# Patient Record
Sex: MALE | Race: BLACK OR AFRICAN AMERICAN | NOT HISPANIC OR LATINO | URBAN - METROPOLITAN AREA
[De-identification: names, ages, dates, MRNs, and addresses within clinical notes are randomized per-mention and may not be internally consistent; named-entity substitution may affect disease eponyms.]

---

## 2017-06-11 ENCOUNTER — EMERGENCY (EMERGENCY)
Facility: HOSPITAL | Age: 52
LOS: 1 days | Discharge: HOME | End: 2017-06-11
Attending: EMERGENCY MEDICINE | Admitting: EMERGENCY MEDICINE
Payer: MEDICAID

## 2017-06-11 VITALS
RESPIRATION RATE: 16 BRPM | HEART RATE: 59 BPM | HEIGHT: 70 IN | OXYGEN SATURATION: 100 % | WEIGHT: 201.94 LBS | DIASTOLIC BLOOD PRESSURE: 91 MMHG | TEMPERATURE: 98 F | SYSTOLIC BLOOD PRESSURE: 148 MMHG

## 2017-06-11 PROCEDURE — 93005 ELECTROCARDIOGRAM TRACING: CPT

## 2017-06-11 PROCEDURE — 93010 ELECTROCARDIOGRAM REPORT: CPT

## 2017-06-11 PROCEDURE — 99283 EMERGENCY DEPT VISIT LOW MDM: CPT | Mod: 25

## 2017-06-11 PROCEDURE — 99284 EMERGENCY DEPT VISIT MOD MDM: CPT | Mod: 25

## 2017-06-11 RX ORDER — METFORMIN HYDROCHLORIDE 850 MG/1
1 TABLET ORAL
Qty: 60 | Refills: 0 | OUTPATIENT
Start: 2017-06-11 | End: 2017-07-11

## 2017-06-11 RX ORDER — LISINOPRIL 2.5 MG/1
1 TABLET ORAL
Qty: 30 | Refills: 0 | OUTPATIENT
Start: 2017-06-11 | End: 2017-07-11

## 2017-06-11 RX ORDER — LISINOPRIL 2.5 MG/1
1 TABLET ORAL
Qty: 0 | Refills: 0 | COMMUNITY

## 2017-06-11 RX ORDER — METFORMIN HYDROCHLORIDE 850 MG/1
1 TABLET ORAL
Qty: 0 | Refills: 0 | COMMUNITY

## 2017-06-11 NOTE — ED PROVIDER NOTE - MEDICAL DECISION MAKING DETAILS
Pt presents requesting med refill. Agitated on exam. Pt reports I just want my prescriptions and to leave. FS 94 in triage. will d/c with f/u PCP @ [facility]

## 2017-06-11 NOTE — ED ADULT NURSE REASSESSMENT NOTE - NS ED NURSE REASSESS COMMENT FT1
Pt became verbally disruptive when asked to remove sunglasses. Pt was allowed to keep sunglasses and became more cooperative. Will continue to monitor.

## 2017-06-11 NOTE — ED PROVIDER NOTE - OBJECTIVE STATEMENT
Pt with PMHx HTN, DM p/w "not feeling well." Pt reports he feels nausea for 2 days. Pt denies vomiting, diarrhea, abdominal pain, CP, SOB, f/c. Pt reports frequent urination w/o dysuria, hesitancy. No increased thirst. Pt agitated w/ questioning on exam. pt reports "I just need my prescriptions and I'll get out of here." Pt reports he takes Lisinopril 5 mg PO QD and Metformin 500 mg BID, and states he ran out of both 2 weeks ago.

## 2017-06-11 NOTE — ED ADULT NURSE NOTE - OBJECTIVE STATEMENT
52 year old male c/o nausea x2 weeks and reported running out of medication x 2 weeks due to insurance. Denies chest pain,. sob, dizziness, n/v/d, fever or chills.

## 2017-06-15 DIAGNOSIS — R11.0 NAUSEA: ICD-10-CM

## 2017-06-15 DIAGNOSIS — I10 ESSENTIAL (PRIMARY) HYPERTENSION: ICD-10-CM

## 2017-06-15 DIAGNOSIS — Z91.018 ALLERGY TO OTHER FOODS: ICD-10-CM

## 2017-06-15 DIAGNOSIS — Z76.0 ENCOUNTER FOR ISSUE OF REPEAT PRESCRIPTION: ICD-10-CM

## 2017-06-15 DIAGNOSIS — E11.9 TYPE 2 DIABETES MELLITUS WITHOUT COMPLICATIONS: ICD-10-CM

## 2022-09-30 ENCOUNTER — EMERGENCY (EMERGENCY)
Facility: HOSPITAL | Age: 57
LOS: 1 days | Discharge: LEFT BEFORE DISCHARGE | End: 2022-09-30
Admitting: EMERGENCY MEDICINE

## 2022-09-30 VITALS
HEART RATE: 86 BPM | HEIGHT: 70 IN | OXYGEN SATURATION: 96 % | WEIGHT: 186.95 LBS | TEMPERATURE: 99 F | DIASTOLIC BLOOD PRESSURE: 109 MMHG | SYSTOLIC BLOOD PRESSURE: 184 MMHG | RESPIRATION RATE: 20 BRPM

## 2022-09-30 PROBLEM — I10 ESSENTIAL (PRIMARY) HYPERTENSION: Chronic | Status: ACTIVE | Noted: 2017-06-11

## 2022-09-30 PROBLEM — E11.9 TYPE 2 DIABETES MELLITUS WITHOUT COMPLICATIONS: Chronic | Status: ACTIVE | Noted: 2017-06-11

## 2022-09-30 PROCEDURE — L9991: CPT

## 2022-09-30 NOTE — ED ADULT TRIAGE NOTE - CHIEF COMPLAINT QUOTE
As per EMS, pt found unresponsive given Narcan. Pt A&Ox3 on arrival and irritable. VS stable. Pt saying he doesn't want to stay. Pt able to ambulate with steady gait. Pt getting aggressive when asked to sit down. IV removed. Ambulated out of ED without assistance.

## 2022-10-02 DIAGNOSIS — R40.4 TRANSIENT ALTERATION OF AWARENESS: ICD-10-CM

## 2022-10-02 DIAGNOSIS — Z53.21 PROCEDURE AND TREATMENT NOT CARRIED OUT DUE TO PATIENT LEAVING PRIOR TO BEING SEEN BY HEALTH CARE PROVIDER: ICD-10-CM

## 2023-12-22 ENCOUNTER — EMERGENCY (EMERGENCY)
Facility: HOSPITAL | Age: 58
LOS: 1 days | Discharge: LEFT BEFORE DISCHARGE | End: 2023-12-22
Attending: EMERGENCY MEDICINE | Admitting: EMERGENCY MEDICINE
Payer: SELF-PAY

## 2023-12-22 VITALS
SYSTOLIC BLOOD PRESSURE: 163 MMHG | HEART RATE: 64 BPM | DIASTOLIC BLOOD PRESSURE: 84 MMHG | RESPIRATION RATE: 16 BRPM | TEMPERATURE: 98 F | OXYGEN SATURATION: 94 %

## 2023-12-22 DIAGNOSIS — E11.9 TYPE 2 DIABETES MELLITUS WITHOUT COMPLICATIONS: ICD-10-CM

## 2023-12-22 DIAGNOSIS — T40.601A POISONING BY UNSPECIFIED NARCOTICS, ACCIDENTAL (UNINTENTIONAL), INITIAL ENCOUNTER: ICD-10-CM

## 2023-12-22 DIAGNOSIS — F11.129 OPIOID ABUSE WITH INTOXICATION, UNSPECIFIED: ICD-10-CM

## 2023-12-22 DIAGNOSIS — Z23 ENCOUNTER FOR IMMUNIZATION: ICD-10-CM

## 2023-12-22 DIAGNOSIS — Z53.29 PROCEDURE AND TREATMENT NOT CARRIED OUT BECAUSE OF PATIENT'S DECISION FOR OTHER REASONS: ICD-10-CM

## 2023-12-22 DIAGNOSIS — I10 ESSENTIAL (PRIMARY) HYPERTENSION: ICD-10-CM

## 2023-12-22 PROCEDURE — 99283 EMERGENCY DEPT VISIT LOW MDM: CPT

## 2023-12-22 PROCEDURE — 99053 MED SERV 10PM-8AM 24 HR FAC: CPT

## 2023-12-22 RX ORDER — NALOXONE HYDROCHLORIDE 4 MG/.1ML
2 SPRAY NASAL ONCE
Refills: 0 | Status: DISCONTINUED | OUTPATIENT
Start: 2023-12-22 | End: 2023-12-25

## 2023-12-22 RX ORDER — TETANUS TOXOID, REDUCED DIPHTHERIA TOXOID AND ACELLULAR PERTUSSIS VACCINE, ADSORBED 5; 2.5; 8; 8; 2.5 [IU]/.5ML; [IU]/.5ML; UG/.5ML; UG/.5ML; UG/.5ML
0.5 SUSPENSION INTRAMUSCULAR ONCE
Refills: 0 | Status: DISCONTINUED | OUTPATIENT
Start: 2023-12-22 | End: 2023-12-25

## 2023-12-22 NOTE — ED PROVIDER NOTE - OBJECTIVE STATEMENT
58M history of HTN, DM, substance abuse  Patient brought in by ambulance from [facility] for suspected opiate overdose after being found with minimal respirations and pinpoint pupils.  Given 4 mg of intranasal Narcan prior to arrival with good response.  The patient denies any drug use upon questioning.  Intermittently compliant with exam due to mental status and clinical condition.  Unable to provide much further information. 58M history of HTN, DM, substance abuse  Patient brought in by ambulance from [facility] for suspected opiate overdose after being found with minimal respirations and pinpoint pupils.  Given 4 mg of intranasal Narcan prior to arrival with good response.  The patient denies any drug use upon questioning.  Intermittently compliant with exam due to mental status and clinical condition.  Unable to provide much further information.

## 2023-12-22 NOTE — ED PROVIDER NOTE - REFUSAL OF SERVICE, MDM
Patient is currently of sound mind and verbalizes understanding that he is leaving prior to medical recommendation with the risk that he may once again become under the influence of the drugs he took, stop breathing, and have cardiopulmonary arrest.

## 2023-12-22 NOTE — ED ADULT TRIAGE NOTE - CHIEF COMPLAINT QUOTE
Pt BIBA from [facility] c/o drug overdose. Was given 4mg IN narcan PTA, arrives with pinpoint pupils bilaterally. Pt BIBA from [facility] c/o drug overdose. Was given 4mg IN narcan PTA, arrives with pinpoint pupils bilaterally.

## 2023-12-22 NOTE — ED PROVIDER NOTE - PHYSICAL EXAMINATION
PE: Moderately clinically intoxicated with pinpoint pupils and decreased respirations and lethargy.  Easily awakened with verbal and/or physical stimuli.  Lungs clear to auscultation bilaterally.  Heart regular rate and rhythm.  Abdomen soft nontender/nondistended.  Abrasions to right fingers and left zygoma.

## 2023-12-22 NOTE — ED ADULT NURSE NOTE - CHIEF COMPLAINT QUOTE
Pt BIBA from [facility] c/o drug overdose. Was given 4mg IN narcan PTA, arrives with pinpoint pupils bilaterally. Pt BIBA from [facility] c/o drug overdose. Was given 4mg IN narcan PTA, arrives with pinpoint pupils bilaterally.

## 2023-12-22 NOTE — ED ADULT NURSE NOTE - OBJECTIVE STATEMENT
pt was found down in shelter elevator. shelter staff administered nasal Narcan pt presents with pinpoint pupil, accusable to verbal stimuli.

## 2023-12-22 NOTE — ED PROVIDER NOTE - PROGRESS NOTE DETAILS
The patient wishes to leave against medical advice. I have discussed the risks, benefits and alternatives (including the possibility of worsening of disease, pain, permanent disability, and/or death) with the patient. The patient voices understanding of these risks, benefits, and alternatives and still wishes to sign out against medical advice. The patient is awake, alert, oriented  x 3 and has demonstrated capacity to refuse/direct care. I have advised the patient that they can and should return immediately should they develop any worse/different/additional symptoms, or if they change their mind and want to continue their care.

## 2023-12-22 NOTE — ED ADULT NURSE NOTE - NSFALLUNIVINTERV_ED_ALL_ED
Bed/Stretcher in lowest position, wheels locked, appropriate side rails in place/Call bell, personal items and telephone in reach/Instruct patient to call for assistance before getting out of bed/chair/stretcher/Non-slip footwear applied when patient is off stretcher/[place] to call system/Physically safe environment - no spills, clutter or unnecessary equipment/Purposeful proactive rounding/Room/bathroom lighting operational, light cord in reach Bed/Stretcher in lowest position, wheels locked, appropriate side rails in place/Call bell, personal items and telephone in reach/Instruct patient to call for assistance before getting out of bed/chair/stretcher/Non-slip footwear applied when patient is off stretcher/[place] to call system/Physically safe environment - no spills, clutter or unnecessary equipment/Purposeful proactive rounding/Room/bathroom lighting operational, light cord in reach

## 2023-12-22 NOTE — ED PROVIDER NOTE - PATIENT PORTAL LINK FT
You can access the FollowMyHealth Patient Portal offered by [facility] by registering at the following website: [email or web address]. By joining [employer]’s FollowMyHealth portal, you will also be able to view your health information using other applications (apps) compatible with our system. You can access the FollowMyHealth Patient Portal offered by [facility] by registering at the following website: [email or web address]. By joining [employer]’s FollowMyHealth portal, you will also be able to view your health information using other applications (apps) compatible with our system.

## 2023-12-22 NOTE — ED PROVIDER NOTE - NSFOLLOWUPINSTRUCTIONS_ED_ALL_ED_FT
Opioid Overdose      Opioids are substances that relieve pain by binding to pain receptors in your brain and spinal cord. Opioids include illegal drugs, such as heroin, as well as prescription pain medicines. An opioid overdose happens when you take too much of an opioid substance. This can happen with any type of opioid, including:    Heroin.  Morphine.  Codeine.  Methadone.  Oxycodone.  Hydrocodone.  Fentanyl.  Hydromorphone.  Buprenorphine.    The effects of an overdose can be mild, dangerous, or even deadly. Opioid overdose is a medical emergency.      What are the causes?    This condition may be caused by:    Taking too much of an opioid by accident.  Taking too much of an opioid on purpose.  An error made by a health care provider who prescribes a medicine.  An error made by the pharmacist who fills the prescription order.  Using more than one substance that contains opioids at the same time.  Mixing an opioid with a substance that affects your heart, breathing, or blood pressure. These include alcohol, tranquilizers, sleeping pills, illegal drugs, and some over-the-counter medicines.      What increases the risk?    This condition is more likely in:    Children. They may be attracted to colorful pills. Because of a child's small size, even a small amount of a drug can be dangerous.  Elderly people. They may be taking many different drugs. Elderly people may have difficulty reading labels or remembering when they last took their medicine.  People who take an opioid on a long-term basis.    People who use:    Illegal drugs.  Other substances, including alcohol, while using an opioid.    People who have:    A history of drug or alcohol abuse.  Certain mental health conditions.  People who take opioids that are not prescribed for them.      What are the signs or symptoms?    Symptoms of this condition depend on the type of opioid and the amount that was taken. Common symptoms include:    Sleepiness or difficulty waking from sleep.  Confusion.  Slurred speech.  Slowed breathing and a slow pulse.  Nausea and vomiting.  Abnormally small pupils.    Signs and symptoms that require emergency treatment include:    Cold, clammy, and pale skin.  Blue lips and fingernails.  Vomiting.  Gurgling sounds in the throat.  A pulse that is very slow or difficult to detect.  Breathing that is very slow, noisy, or difficult to detect.  Limp body.  Inability to respond to speech or be awakened from sleep (stupor).      How is this diagnosed?    This condition is diagnosed based on your symptoms. It is important to tell your health care provider:    All of the opioids that you took.  When you took the opioids.  Whether you were drinking alcohol or using other substances.    Your health care provider will do a physical exam. This exam may include:    Checking and monitoring your heart rate and rhythm, your breathing rate and depth, your temperature, and your blood pressure (vital signs).  Checking for abnormally small pupils.  Measuring oxygen levels in your blood.  You may also have blood tests or urine tests.    How is this treated?    Supporting your vital signs and your breathing is the first step in treating an opioid overdose. Treatment may also include:    Giving fluids and minerals (electrolytes) through an IV tube.  Inserting a breathing tube (endotracheal tube) in your airway to help you breathe.  Giving oxygen.  Passing a tube through your nose and into your stomach (NG tube, or nasogastric tube) to wash out your stomach.    Giving medicines that:    Increase your blood pressure.  Absorb any opioid that is in your digestive system.  Reverse the effects of the opioid (naloxone).  Ongoing counseling and mental health support if you intentionally overdosed or used an illegal drug.    Follow these instructions at home:     Take over-the-counter and prescription medicines only as told by your health care provider. Always ask your health care provider about possible side effects and interactions of any new medicine that you start taking.  Keep a list of all of the medicines that you take, including over-the-counter medicines. Bring this list with you to all of your medical visits.  Drink enough fluid to keep your urine clear or pale yellow.  Keep all follow-up visits as told by your health care provider. This is important.    How is this prevented?    Get help if you are struggling with:    Alcohol or drug use.  Depression or another mental health problem.  Keep the phone number of your local poison control center near your phone or on your cell phone.  Store all medicines in safety containers that are out of the reach of children.  Read the drug inserts that come with your medicines.  Do not drink alcohol when taking opioids.  Do not use illegal drugs.  Do not take opioid medicines that are not prescribed for you.    Contact a health care provider if:    Your symptoms return.  You develop new symptoms or side effects when you are taking medicines.    Get help right away if:    You think that you or someone else may have taken too much of an opioid. The hotline of the National Poison Control Center is (699) 001-8575.  You or someone else is having symptoms of an opioid overdose.  You have serious thoughts about hurting yourself or others.    You have:    Chest pain.  Difficulty breathing.  A loss of consciousness.  Opioid overdose is an emergency. Do not wait to see if the symptoms will go away. Get medical help right away. Call your local emergency services (911 in the [place] Do not drive yourself to the hospital.     This information is not intended to replace advice given to you by your health care provider. Make sure you discuss any questions you have with your health care provider. Opioid Overdose      Opioids are substances that relieve pain by binding to pain receptors in your brain and spinal cord. Opioids include illegal drugs, such as heroin, as well as prescription pain medicines. An opioid overdose happens when you take too much of an opioid substance. This can happen with any type of opioid, including:    Heroin.  Morphine.  Codeine.  Methadone.  Oxycodone.  Hydrocodone.  Fentanyl.  Hydromorphone.  Buprenorphine.    The effects of an overdose can be mild, dangerous, or even deadly. Opioid overdose is a medical emergency.      What are the causes?    This condition may be caused by:    Taking too much of an opioid by accident.  Taking too much of an opioid on purpose.  An error made by a health care provider who prescribes a medicine.  An error made by the pharmacist who fills the prescription order.  Using more than one substance that contains opioids at the same time.  Mixing an opioid with a substance that affects your heart, breathing, or blood pressure. These include alcohol, tranquilizers, sleeping pills, illegal drugs, and some over-the-counter medicines.      What increases the risk?    This condition is more likely in:    Children. They may be attracted to colorful pills. Because of a child's small size, even a small amount of a drug can be dangerous.  Elderly people. They may be taking many different drugs. Elderly people may have difficulty reading labels or remembering when they last took their medicine.  People who take an opioid on a long-term basis.    People who use:    Illegal drugs.  Other substances, including alcohol, while using an opioid.    People who have:    A history of drug or alcohol abuse.  Certain mental health conditions.  People who take opioids that are not prescribed for them.      What are the signs or symptoms?    Symptoms of this condition depend on the type of opioid and the amount that was taken. Common symptoms include:    Sleepiness or difficulty waking from sleep.  Confusion.  Slurred speech.  Slowed breathing and a slow pulse.  Nausea and vomiting.  Abnormally small pupils.    Signs and symptoms that require emergency treatment include:    Cold, clammy, and pale skin.  Blue lips and fingernails.  Vomiting.  Gurgling sounds in the throat.  A pulse that is very slow or difficult to detect.  Breathing that is very slow, noisy, or difficult to detect.  Limp body.  Inability to respond to speech or be awakened from sleep (stupor).      How is this diagnosed?    This condition is diagnosed based on your symptoms. It is important to tell your health care provider:    All of the opioids that you took.  When you took the opioids.  Whether you were drinking alcohol or using other substances.    Your health care provider will do a physical exam. This exam may include:    Checking and monitoring your heart rate and rhythm, your breathing rate and depth, your temperature, and your blood pressure (vital signs).  Checking for abnormally small pupils.  Measuring oxygen levels in your blood.  You may also have blood tests or urine tests.    How is this treated?    Supporting your vital signs and your breathing is the first step in treating an opioid overdose. Treatment may also include:    Giving fluids and minerals (electrolytes) through an IV tube.  Inserting a breathing tube (endotracheal tube) in your airway to help you breathe.  Giving oxygen.  Passing a tube through your nose and into your stomach (NG tube, or nasogastric tube) to wash out your stomach.    Giving medicines that:    Increase your blood pressure.  Absorb any opioid that is in your digestive system.  Reverse the effects of the opioid (naloxone).  Ongoing counseling and mental health support if you intentionally overdosed or used an illegal drug.    Follow these instructions at home:     Take over-the-counter and prescription medicines only as told by your health care provider. Always ask your health care provider about possible side effects and interactions of any new medicine that you start taking.  Keep a list of all of the medicines that you take, including over-the-counter medicines. Bring this list with you to all of your medical visits.  Drink enough fluid to keep your urine clear or pale yellow.  Keep all follow-up visits as told by your health care provider. This is important.    How is this prevented?    Get help if you are struggling with:    Alcohol or drug use.  Depression or another mental health problem.  Keep the phone number of your local poison control center near your phone or on your cell phone.  Store all medicines in safety containers that are out of the reach of children.  Read the drug inserts that come with your medicines.  Do not drink alcohol when taking opioids.  Do not use illegal drugs.  Do not take opioid medicines that are not prescribed for you.    Contact a health care provider if:    Your symptoms return.  You develop new symptoms or side effects when you are taking medicines.    Get help right away if:    You think that you or someone else may have taken too much of an opioid. The hotline of the National Poison Control Center is (305) 640-2221.  You or someone else is having symptoms of an opioid overdose.  You have serious thoughts about hurting yourself or others.    You have:    Chest pain.  Difficulty breathing.  A loss of consciousness.  Opioid overdose is an emergency. Do not wait to see if the symptoms will go away. Get medical help right away. Call your local emergency services (911 in the [place] Do not drive yourself to the hospital.     This information is not intended to replace advice given to you by your health care provider. Make sure you discuss any questions you have with your health care provider.

## 2023-12-22 NOTE — ED ADULT NURSE REASSESSMENT NOTE - NS ED NURSE REASSESS COMMENT FT1
pt became agitated. [place] was called. voiced need to leave. pt aox4, verbalized understanding of risk of leaving AMA. pt refused to sign AMA paperwork. verbalization of risk witnessed by RN and MD

## 2023-12-22 NOTE — ED PROVIDER NOTE - CLINICAL SUMMARY MEDICAL DECISION MAKING FREE TEXT BOX
58M history of HTN, DM, substance abuse  Patient brought in by ambulance from [facility] for suspected opiate overdose after being found with minimal respirations and pinpoint pupils.  Given 4 mg of intranasal Narcan prior to arrival with good response.  The patient denies any drug use upon questioning.  Intermittently compliant with exam due to mental status and clinical condition.  Unable to provide much further information.    PE: Moderately clinically intoxicated with pinpoint pupils and decreased respirations and lethargy.  Easily awakened with verbal and/or physical stimuli.  Lungs clear to auscultation bilaterally.  Heart regular rate and rhythm.  Abdomen soft nontender/nondistended.  Abrasions to right fingers and left zygoma.    MDM: Patient presents with signs and symptoms consistent with opiate overdose and given 4 mg of intranasal Narcan prior to arrival with improvement in mental status and respiratory rate.  Will nebulize Narcan and give to patient while he is in the ED as he is still moderately intoxicated.  Once patient is more clinically stable will reevaluate for need of imaging. 58M history of HTN, DM, substance abuse  Patient brought in by ambulance from [facility] for suspected opiate overdose after being found with minimal respirations and pinpoint pupils.  Given 4 mg of intranasal Narcan prior to arrival with good response.  The patient denies any drug use upon questioning.  Intermittently compliant with exam due to mental status and clinical condition.  Unable to provide much further information.    PE: Moderately clinically intoxicated with pinpoint pupils and decreased respirations and lethargy.  Easily awakened with verbal and/or physical stimuli.  Lungs clear to auscultation bilaterally.  Heart regular rate and rhythm.  Abdomen soft nontender/nondistended.  Abrasions to right fingers and left zygoma.    MDM: Patient presents with signs and symptoms consistent with opiate overdose and given 4 mg of intranasal Narcan prior to arrival with improvement in mental status and respiratory rate.  Will nebulize Narcan and give to patient while he is in the ED as he is still moderately intoxicated.  Once patient is more clinically stable will reevaluate for need of imaging.

## 2024-01-26 ENCOUNTER — EMERGENCY (EMERGENCY)
Facility: HOSPITAL | Age: 59
LOS: 1 days | Discharge: HOME | End: 2024-01-26
Attending: EMERGENCY MEDICINE | Admitting: EMERGENCY MEDICINE
Payer: MEDICAID

## 2024-01-26 VITALS
SYSTOLIC BLOOD PRESSURE: 138 MMHG | OXYGEN SATURATION: 98 % | HEART RATE: 87 BPM | DIASTOLIC BLOOD PRESSURE: 90 MMHG | TEMPERATURE: 98 F | RESPIRATION RATE: 16 BRPM

## 2024-01-26 PROCEDURE — 99283 EMERGENCY DEPT VISIT LOW MDM: CPT

## 2024-01-26 PROCEDURE — 99053 MED SERV 10PM-8AM 24 HR FAC: CPT

## 2024-01-26 NOTE — ED PROVIDER NOTE - OBJECTIVE STATEMENT
58M history of HTN  Brought in by EMS from [facility] were they state they found the patient in a stairwell and required Narcan 2 mg intranasal.  Patient has a different review of tonight's events.  States that he was given a piece of nicotine gum that made him feel nauseated, so he was in the staircase slouched over because he was not feeling well and the next thing he remembers is everybody being around him and bring him to the hospital.  He was able to walk himself into the ambulance and out of the ambulance, and states that the nausea has passed.  He denies any substance use this evening or alcohol ingestion.

## 2024-01-26 NOTE — ED PROVIDER NOTE - PATIENT PORTAL LINK FT
You can access the FollowMyHealth Patient Portal offered by [facility] by registering at the following website: [email or web address]. By joining [employer]’s FollowMyHealth portal, you will also be able to view your health information using other applications (apps) compatible with our system.

## 2024-01-26 NOTE — ED PROVIDER NOTE - PHYSICAL EXAMINATION
PE: Well-appearing, no acute distress, alert and oriented x 3, calm and cooperative.  Nonlabored respirations clear to auscultation bilaterally.  Heart regular rate and rhythm.  Pupils equal round reactive to light.

## 2024-01-26 NOTE — ED ADULT NURSE NOTE - CHIEF COMPLAINT QUOTE
BIBA from [facility]. As per EMS pt. was found unconscious in bathroom, staff gave 2mg Narcan IN. Pt. states that he just chewed nicotine gum. Denies drugs or alcohol. Pt, has no complaints and would just like DC papers to go.

## 2024-01-26 NOTE — ED ADULT TRIAGE NOTE - CHIEF COMPLAINT QUOTE
BIBA from [facility]. As per EMS pt. was found unconscious in bathroom, staff gave 2mg Narcan IN. Pt. states that he just chewed nicotine gum. Denies drugs or alcohol. Pt, has no complaints and would just like DC papers to go.

## 2024-01-26 NOTE — ED PROVIDER NOTE - CLINICAL SUMMARY MEDICAL DECISION MAKING FREE TEXT BOX
58M history of HTN  Brought in by EMS from [facility] were they state they found the patient in a stairwell and required Narcan 2 mg intranasal.  Patient has a different review of tonight's events.  States that he was given a piece of nicotine gum that made him feel nauseated, so he was in the staircase slouched over because he was not feeling well and the next thing he remembers is everybody being around him and bring him to the hospital.  He was able to walk himself into the ambulance and out of the ambulance, and states that the nausea has passed.  He denies any substance use this evening or alcohol ingestion.    PE: Well-appearing, no acute distress, alert and oriented x 3, calm and cooperative.  Nonlabored respirations clear to auscultation bilaterally.  Heart regular rate and rhythm.  Pupils equal round reactive to light.    MDM: Patient brought in for medical screening after concern of possible opiate overdose.  Patient is adamant about not using any drugs, but feeling sick to his stomach after chewing nicotine gum.  Currently patient is stable with unremarkable physical exam and no signs of opiate intoxication.  Will discharge back to the shelter.

## 2024-01-26 NOTE — ED PROVIDER NOTE - NSFOLLOWUPINSTRUCTIONS_ED_ALL_ED_FT
PLEASE FOLLOW-UP WITH YOUR PRIMARY CARE DOCTOR IN 1-2 DAYS FOR FURTHER EVALUATION.      PLEASE TAKE ALL PAPERWORK FROM TODAY'S VISIT TO YOUR PRIMARY DOCTOR.     IF YOU DO NOT HAVE A PRIMARY CARE DOCTOR PLEASE REFER TO THE OFFICE/CLINIC INFORMATION GIVEN BELOW:    If you do not have a doctor, you can call our referral line to find a doctor that matches your insurance; the number is [phone].     You can also follow up with clinics listed below, if you do not have a doctor:  [address] [address]  To make an appointment, call [phone]    [facility]  Address: [address] [place] [address]  Appointment Center: [phone] ([phone])     PLEASE RETURN TO THE ER IMMEDIATELY OR CALL 541 ANY HIGH FEVER, CHEST PAIN, TROUBLE BREATHING, VOMITING, SEVERE PAIN, OR ANY OTHER CONCERNS.    To access your record on the patient portal [facility], please visit:  [email or web address]  If you are having difficulties setting this up, call [phone] and someone can assist you over the phone.

## 2024-01-26 NOTE — ED ADULT NURSE NOTE - NSFALLUNIVINTERV_ED_ALL_ED
Bed/Stretcher in lowest position, wheels locked, appropriate side rails in place/Call bell, personal items and telephone in reach/Instruct patient to call for assistance before getting out of bed/chair/stretcher/Non-slip footwear applied when patient is off stretcher/[place] to call system/Physically safe environment - no spills, clutter or unnecessary equipment/Purposeful proactive rounding/Room/bathroom lighting operational, light cord in reach

## 2024-01-29 DIAGNOSIS — Z91.018 ALLERGY TO OTHER FOODS: ICD-10-CM

## 2024-01-29 DIAGNOSIS — I10 ESSENTIAL (PRIMARY) HYPERTENSION: ICD-10-CM

## 2024-01-29 DIAGNOSIS — Z13.9 ENCOUNTER FOR SCREENING, UNSPECIFIED: ICD-10-CM

## 2024-01-29 DIAGNOSIS — R11.0 NAUSEA: ICD-10-CM

## 2024-02-05 ENCOUNTER — EMERGENCY (EMERGENCY)
Facility: HOSPITAL | Age: 59
LOS: 1 days | Discharge: HOME | End: 2024-02-05
Attending: EMERGENCY MEDICINE | Admitting: EMERGENCY MEDICINE
Payer: MEDICAID

## 2024-02-05 VITALS
DIASTOLIC BLOOD PRESSURE: 105 MMHG | SYSTOLIC BLOOD PRESSURE: 163 MMHG | HEART RATE: 97 BPM | RESPIRATION RATE: 16 BRPM | TEMPERATURE: 98 F | WEIGHT: 160.06 LBS | OXYGEN SATURATION: 98 %

## 2024-02-05 DIAGNOSIS — R41.82 ALTERED MENTAL STATUS, UNSPECIFIED: ICD-10-CM

## 2024-02-05 DIAGNOSIS — Z91.018 ALLERGY TO OTHER FOODS: ICD-10-CM

## 2024-02-05 DIAGNOSIS — T40.2X1A POISONING BY OTHER OPIOIDS, ACCIDENTAL (UNINTENTIONAL), INITIAL ENCOUNTER: ICD-10-CM

## 2024-02-05 LAB — GLUCOSE BLDC GLUCOMTR-MCNC: 122 MG/DL — HIGH (ref 70–99)

## 2024-02-05 PROCEDURE — 99283 EMERGENCY DEPT VISIT LOW MDM: CPT

## 2024-02-05 NOTE — ED ADULT TRIAGE NOTE - CHIEF COMPLAINT QUOTE
Pt from shelter, found unresponsive , pinpoint pupil and agonal breathing. Given 2mg of Narcan intranasal by EMS with good response. Pt escorted by [employer], hostile on scene

## 2024-02-05 NOTE — ED PROVIDER NOTE - PATIENT PORTAL LINK FT
You can access the FollowMyHealth Patient Portal offered by [facility] by registering at the following website: [email or web address]. By joining [employer]’s FollowMyHealth portal, you will also be able to view your health information using other applications (apps) compatible with our system.

## 2024-02-05 NOTE — ED PROVIDER NOTE - PHYSICAL EXAMINATION
VITAL SIGNS: I have reviewed nursing notes and confirm.   CONST: Well-developed; well-nourished; No apparent distress.  ENT: No nasal discharge; airway clear.  HEAD: Normocephalic; atraumatic.  EYES: Sclera clear. Pupils round and symmetrical bilaterally. 3mm.   CARD: S1, S2 normal; no murmurs, gallops, or rubs. Regular rate and rhythm.  RESP: No wheezes, rales or rhonchi. Breathing comfortably; speaking in full sentences.   MSK: No acute deformities noted to extremities.   NEURO: Alert, oriented. Speech is fluent and appropriate. Moving all extremities appropriately. No gross motor or sensory abnormalities.   SKIN: Skin is normal temperature; no diaphoresis; no pallor.   PSYCH: Cooperative. Appropriate mood, language, and behavior.

## 2024-02-05 NOTE — ED ADULT NURSE NOTE - NSFALLUNIVINTERV_ED_ALL_ED
Bed/Stretcher in lowest position, wheels locked, appropriate side rails in place/Call bell, personal items and telephone in reach/Instruct patient to call for assistance before getting out of bed/chair/stretcher/Non-slip footwear applied when patient is off stretcher/[place] to call system/Physically safe environment - no spills, clutter or unnecessary equipment/Purposeful proactive rounding/Room/bathroom lighting operational, light cord in reach

## 2024-02-05 NOTE — ED PROVIDER NOTE - OBJECTIVE STATEMENT
58-year-old man brought in by EMS following presumed drug overdose at the shelter.  He was found unresponsive with pinpoint pupils and woke up with 2 mg of intranasal Narcan.  He is very upset here and wants to go.  He insists that he did not use any drugs and that he was standing up wide-awake when they gave Narcan.  It is notable that he has had 2 other ED visits since late December for similar presentations. He was accompanied in by the police and arrived handcuffed but calmed down by the time I had seen him.

## 2024-02-05 NOTE — ED PROVIDER NOTE - NSFOLLOWUPINSTRUCTIONS_ED_ALL_ED_FT
room air
Please get help for alcohol abuse if you drink heavily or use drugs on a regular basis.     [facility] is a good referral line for crisis and substance abuse help.  [facility] has drop in programs all over the [facility].    Return to the ER for Emergencies.     [facility]: [phone]   [facility] Substance Abuse Services: [phone], option #2   Methadone Maintenance & Ambulatory Opiate Detox: [phone]  [facility]: [phone]  [facility]: [phone]  [facility]: [phone]    [facility]: [phone], option #2   [facility]: [phone]    [facility]: [phone]    [facility] Central Intake: [phone]  [facility] Chemical Dependency/Ancillary Withdrawal: [phone]  [facility] Methadone Maintenance: [phone]    [facility]: [phone]  [facility] Addiction Treatment Services: [phone]    [facility]Line: 4-819-5-[facility] Office of Alcoholism and Substance Abuse Services (OASAS): https://www.oasas.[place].gov/providerdirectory/  [facility] for Addiction Services and Psychotherapy Interventions Research (CASPIR)  www.[facility].org     Interested in discussing options to reduce your tobacco use?    [facility] for Tobacco Control:  [phone]  [facility] QUITLINE: 9-979-IZ-QUITS ([phone])    Interested in learning more about substance use?      http://rethinkingdrinking.niaaa.nih.gov   https://www.drugabuse.gov/patients-families     Learn more about opioid overdose prevention programs in [place]:  http://www.health.[place].gov/diseases/aids/general/opioid_overdose_prevention/

## 2024-02-05 NOTE — ED ADULT NURSE NOTE - NSICDXPASTMEDICALHX_GEN_ALL_CORE_FT
patient admitted with CHF exacerbation, has hx of afib, on heparin drip as ordered
PAST MEDICAL HISTORY:  Diabetes     HTN (hypertension)

## 2024-02-05 NOTE — ED ADULT NURSE NOTE - OBJECTIVE STATEMENT
pt from shelter, was found unresponsive; was administered 2mg intranasal Narcan by EMS; pt arrives alert, awake, oriented x4, in no acute distress; pt ambulatory with steady gait; denies pain/injury

## 2024-02-05 NOTE — ED PROVIDER NOTE - CLINICAL SUMMARY MEDICAL DECISION MAKING FREE TEXT BOX
58-year-old man brought in by EMS after apparent opioid overdose.  The patient denies this vehemently.  He is wide-awake and is insisting on leaving.  Will feed and try to delay the discharge to bilateral more time.

## 2024-02-18 ENCOUNTER — EMERGENCY (EMERGENCY)
Facility: HOSPITAL | Age: 59
LOS: 1 days | Discharge: HOME | End: 2024-02-18
Admitting: EMERGENCY MEDICINE
Payer: MEDICAID

## 2024-02-18 VITALS
SYSTOLIC BLOOD PRESSURE: 194 MMHG | RESPIRATION RATE: 15 BRPM | DIASTOLIC BLOOD PRESSURE: 126 MMHG | HEART RATE: 72 BPM | TEMPERATURE: 98 F | OXYGEN SATURATION: 98 %

## 2024-02-18 VITALS
HEART RATE: 68 BPM | OXYGEN SATURATION: 98 % | SYSTOLIC BLOOD PRESSURE: 188 MMHG | DIASTOLIC BLOOD PRESSURE: 84 MMHG | TEMPERATURE: 98 F | RESPIRATION RATE: 16 BRPM

## 2024-02-18 PROCEDURE — 99283 EMERGENCY DEPT VISIT LOW MDM: CPT

## 2024-02-18 NOTE — ED PROVIDER NOTE - OBJECTIVE STATEMENT
58-year-old male, past medical history of hypertension, presenting to the emergency room for evaluation after that was of concern for possible overdose.  Patient has had multiple evaluations in this emergency department for similar reason.  Patient reports he took his amlodipine and Tylenol and decided to take a nap.  He states when they attempted to wake him, he did not immediately respond and EMS was called.  Patient adamantly denying any active or recent drug or alcohol use.  He denies any medical complaints at this time and states he is at his baseline.

## 2024-02-18 NOTE — ED PROVIDER NOTE - CLINICAL SUMMARY MEDICAL DECISION MAKING FREE TEXT BOX
58-year-old male, past medical history of hypertension, presenting to the emergency room for evaluation after that was of concern for possible overdose.  Patient is noted to have an elevated blood pressure in triage which improved upon repeat down to 188/84.  Denies any active medical complaints at this time.  He is noted to have a normal physical exam.  Will plan to discharge this patient is clinically sober and does not exert any abnormal behavior.  PMD follow-up was encouraged for blood pressure management.  Return precautions discussed and patient stable as he leaves.

## 2024-02-18 NOTE — ED ADULT NURSE NOTE - NSFALLUNIVINTERV_ED_ALL_ED
Bed/Stretcher in lowest position, wheels locked, appropriate side rails in place/Call bell, personal items and telephone in reach/Instruct patient to call for assistance before getting out of bed/chair/stretcher/Non-slip footwear applied when patient is off stretcher/[place] to call system/Physically safe environment - no spills, clutter or unnecessary equipment/Purposeful proactive rounding/Room/bathroom lighting operational, light cord in reach

## 2024-02-18 NOTE — ED ADULT TRIAGE NOTE - CHIEF COMPLAINT QUOTE
patient walk in with EMS from shelter; staff called 911 "after they thought he overdosed as he was sleeping a lot; they may have given him narcan"; patient says he took his amlodipine, tylneol then went to sleep and woke to staff grabbing his shirt; patient walk in with steady gait and is awake/alert and oriented x4; denies any drugs or alcohol

## 2024-02-18 NOTE — ED PROVIDER NOTE - PHYSICAL EXAMINATION
VITAL SIGNS: I have reviewed nursing notes and confirm.  CONSTITUTIONAL: Well-developed; well-nourished; in no acute distress.  SKIN: Skin is warm and dry, no acute rash.  HEAD: Normocephalic; atraumatic.  NECK: Supple; non tender.  CARD: S1, S2 normal; no murmurs, gallops, or rubs. Regular rate and rhythm.  RESP: No wheezes, rales or rhonchi.  ABD: Soft; non-distended; non-tender; no rebound or guarding.  EXT: Normal ROM. No clubbing, cyanosis or edema.  NEURO: Alert, oriented. Grossly unremarkable. RUSSELL, normal tone, no gross motor or sensory changes. Fluent speech.   PSYCH: Cooperative, appropriate. Mood and affect wnl.

## 2024-02-18 NOTE — ED PROVIDER NOTE - NSFOLLOWUPINSTRUCTIONS_ED_ALL_ED_FT
Medical Screening Exam    A medical screening exam (MSE) helps to determine whether you need immediate medical treatment relating to any number of symptoms you are having. This type of exam may be done in an emergency department, an urgent care setting, or your health care provider's office.    Depending on your symptoms and severity, you may need additional tests or medical therapy.    It is important to note that an MSE does not necessarily mean that you will need or receive further medical testing or interventions if your symptoms are not deemed to be medically urgent (emergent).    Tell a health care provider about:  Any allergies you have.  All medicines you are taking, including vitamins, herbs, eye drops, creams, and over-the-counter medicines.  Any problems you or family members have had with anesthetic medicines.  Any bleeding problems you have.  Any surgeries you have had.  Any medical conditions you have.  Whether you are pregnant or may be pregnant.  What happens during the test?  A health care provider touching a person's throat during a medical exam.  During the exam, a health care provider does a short, often focused, physical exam and asks about your medical history to assess:  Your current symptoms.  Your overall health.  Your need for possible further medical intervention.  What can I expect after the test?  If you have a regular health care provider, make an appointment for a follow-up visit with him or her. If you do not have a regular health care provider, ask about resources in your community.    Your medical screening exam may determine that:  You do not need emergency treatment at this time.  You need treatment right away.  You need to be transferred to another medical center. This may happen if you need an emergent specialist or consultant that is not available at the medical center you are at.  You need to have more tests.  A medical specialist may be consulted if needed.    Get help right away if:  Your condition gets worse.  You develop new or troubling symptoms before you see your health care provider.  These symptoms may represent a serious problem that is an emergency. Do not wait to see if the symptoms will go away. Get medical help right away. Call your local emergency services (911 in the [place] Do not drive yourself to the hospital.    Summary  A medical screening exam helps to determine whether you need medical treatment right away. This type of exam may be done in an emergency department, an urgent care setting, or your health care provider's office.  During the exam, a health care provider does a short physical exam and asks about your current symptoms and overall health.  Depending on the exam, more tests or therapies may be ordered. However, an MSE does not necessarily mean that you will have further medical testing if your symptoms are not deemed to be urgent.  If you need further care that is not offered at your current medical center, you may need to be transferred to another facility.  This information is not intended to replace advice given to you by your health care provider. Make sure you discuss any questions you have with your health care provider.

## 2024-02-18 NOTE — ED ADULT NURSE NOTE - OBJECTIVE STATEMENT
Patient reports here for medical clearance so he can go back to shelter. Reports woken up from a nap by shelter staff accusing him of being "high." Patient denied drugs. States he took tylenol and bp medication. Patient is A&OX4, speaking clearly and coherently and ambulating with steady gait.

## 2024-02-18 NOTE — ED PROVIDER NOTE - PATIENT PORTAL LINK FT
You can access the FollowMyHealth Patient Portal offered by [facility] by registering at the following website: [email or web address]. By joining [employer]’s FollowMyHealth portal, you will also be able to view your health information using other applications (apps) compatible with our system.

## 2024-02-22 ENCOUNTER — EMERGENCY (EMERGENCY)
Facility: HOSPITAL | Age: 59
LOS: 1 days | Discharge: HOME | End: 2024-02-22
Admitting: EMERGENCY MEDICINE
Payer: MEDICAID

## 2024-02-22 VITALS
DIASTOLIC BLOOD PRESSURE: 99 MMHG | OXYGEN SATURATION: 97 % | SYSTOLIC BLOOD PRESSURE: 172 MMHG | HEART RATE: 94 BPM | TEMPERATURE: 98 F | RESPIRATION RATE: 16 BRPM

## 2024-02-22 DIAGNOSIS — I10 ESSENTIAL (PRIMARY) HYPERTENSION: ICD-10-CM

## 2024-02-22 DIAGNOSIS — R41.82 ALTERED MENTAL STATUS, UNSPECIFIED: ICD-10-CM

## 2024-02-22 DIAGNOSIS — Z01.89 ENCOUNTER FOR OTHER SPECIFIED SPECIAL EXAMINATIONS: ICD-10-CM

## 2024-02-22 PROCEDURE — 99283 EMERGENCY DEPT VISIT LOW MDM: CPT

## 2024-02-22 NOTE — ED ADULT TRIAGE NOTE - BEFAST ARM SIDE DRIFT
No
pt c/o head trauma s/p physiological fall after using toilet. denies LOC. wound on forehead with band aid in place. PMHx HTN, HLD, GERD

## 2024-02-22 NOTE — ED ADULT NURSE NOTE - OBJECTIVE STATEMENT
Pt adamantly denies any drug use/narcan administration. Pt denies any symptoms at this time. States he just wants food and D/C papers

## 2024-02-22 NOTE — ED ADULT TRIAGE NOTE - CHIEF COMPLAINT QUOTE
BIBEMS from shelter. As per ems pt was given 4mg in narcan after he was found lethargic and hard to arouse. Pt states no one gave him narcan bc he knows what it tastes like. Pt denies drug use today. Pt aox4 with steady gait. Pt just needs discharge papers to return to shelter. pt ambulating with steady gait on arrival.

## 2024-02-22 NOTE — ED PROVIDER NOTE - NEURO NEGATIVE STATEMENT, MLM
Post-Care Instructions: I reviewed with the patient in detail post-care instructions. Patient is to wear sunprotection, and avoid picking at any of the treated lesions. Pt may apply Vaseline to crusted or scabbing areas. Medical Necessity Clause: This procedure was medically necessary because the lesions that were treated were: Include Z78.9 (Other Specified Conditions Influencing Health Status) As An Associated Diagnosis?: No Detail Level: Detailed Consent: The patient's consent was obtained including but not limited to risks of crusting, scabbing, blistering, scarring, darker or lighter pigmentary change, recurrence, incomplete removal and infection. Medical Necessity Information: It is in your best interest to select a reason for this procedure from the list below. All of these items fulfill various CMS LCD requirements except the new and changing color options. no loss of consciousness, no gait abnormality, no headache, no sensory deficits, and no weakness. Number Of Freeze-Thaw Cycles: 2 freeze-thaw cycles Duration Of Freeze Thaw-Cycle (Seconds): 3

## 2024-02-22 NOTE — ED PROVIDER NOTE - CLINICAL SUMMARY MEDICAL DECISION MAKING FREE TEXT BOX
ams, possibly 2/2 substance, no trauma noted or reported, protecting airway, will monitor for safety and reassessment for mental status  clinically sober with steady gait clear speech, tolerated PO. No medical complaints, stable for dc home.

## 2024-02-22 NOTE — ED PROVIDER NOTE - PATIENT PORTAL LINK FT
You can access the FollowMyHealth Patient Portal offered by [facility] by registering at the following website: [email or web address]. By joining [employer]’s FollowMyHealth portal, you will also be able to view your health information using other applications (apps) compatible with our system.

## 2024-02-26 DIAGNOSIS — R41.82 ALTERED MENTAL STATUS, UNSPECIFIED: ICD-10-CM

## 2024-02-26 DIAGNOSIS — F19.10 OTHER PSYCHOACTIVE SUBSTANCE ABUSE, UNCOMPLICATED: ICD-10-CM

## 2024-02-26 DIAGNOSIS — Z59.01 SHELTERED HOMELESSNESS: ICD-10-CM

## 2024-02-26 DIAGNOSIS — Z91.018 ALLERGY TO OTHER FOODS: ICD-10-CM

## 2024-02-26 SDOH — ECONOMIC STABILITY - HOUSING INSECURITY: SHELTERED HOMELESSNESS: Z59.01

## 2024-04-17 NOTE — ED PROVIDER NOTE - IV ALTEPLASE EXCL ABS HIDDEN
Detail Level: Zone
Discontinue Regimen: Zyrtec 10 mg tablet \\nSig: Take 1 tablet PO QD\\n\\nclobetasol 0.05 % topical ointment \\nSig: Apply to rash on arms BID PRN
show